# Patient Record
Sex: FEMALE | Race: WHITE | URBAN - METROPOLITAN AREA
[De-identification: names, ages, dates, MRNs, and addresses within clinical notes are randomized per-mention and may not be internally consistent; named-entity substitution may affect disease eponyms.]

---

## 2022-02-21 ENCOUNTER — OFFICE VISIT (OUTPATIENT)
Dept: URBAN - METROPOLITAN AREA CLINIC 89 | Facility: CLINIC | Age: 32
End: 2022-02-21
Payer: COMMERCIAL

## 2022-02-21 DIAGNOSIS — H52.223 REGULAR ASTIGMATISM, BILATERAL: ICD-10-CM

## 2022-02-21 DIAGNOSIS — H11.153 PINGUECULA, BILATERAL: Primary | ICD-10-CM

## 2022-02-21 PROCEDURE — 92004 COMPRE OPH EXAM NEW PT 1/>: CPT | Performed by: OPTOMETRIST

## 2022-02-21 ASSESSMENT — INTRAOCULAR PRESSURE
OS: 13
OD: 15

## 2022-02-21 ASSESSMENT — KERATOMETRY
OD: 44.65
OS: 44.60

## 2023-02-20 ENCOUNTER — OFFICE VISIT (OUTPATIENT)
Dept: URBAN - METROPOLITAN AREA CLINIC 89 | Facility: CLINIC | Age: 33
End: 2023-02-20
Payer: COMMERCIAL

## 2023-02-20 DIAGNOSIS — G43.909 MIGRAINE: Primary | ICD-10-CM

## 2023-02-20 DIAGNOSIS — H11.153 PINGUECULA, BILATERAL: ICD-10-CM

## 2023-02-20 DIAGNOSIS — H52.223 REGULAR ASTIGMATISM, BILATERAL: ICD-10-CM

## 2023-02-20 PROCEDURE — 92014 COMPRE OPH EXAM EST PT 1/>: CPT | Performed by: OPTOMETRIST

## 2023-02-20 ASSESSMENT — INTRAOCULAR PRESSURE
OD: 17
OS: 16

## 2023-02-20 NOTE — IMPRESSION/PLAN
Impression: Migraine: G43.909. 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 Will refer patient back to Dr Roberth Mena for monitoring to ensure stability.